# Patient Record
Sex: FEMALE | Race: WHITE | NOT HISPANIC OR LATINO | URBAN - METROPOLITAN AREA
[De-identification: names, ages, dates, MRNs, and addresses within clinical notes are randomized per-mention and may not be internally consistent; named-entity substitution may affect disease eponyms.]

---

## 2020-03-12 ENCOUNTER — TELEPHONE (OUTPATIENT)
Dept: FAMILY MEDICINE CLINIC | Facility: CLINIC | Age: 75
End: 2020-03-12

## 2020-03-12 NOTE — TELEPHONE ENCOUNTER
----- Message from Ella Forrest MA sent at 3/11/2020  3:53 PM EDT -----  This pt is in need for an appointment  This pt have never been seen in out department nor has an [place] [id number] appointment  Please call to make these appointments  After 3 attempts please send back to Allan Oliveira to send letter  If found that the pt has moved or est care somewhere else please provide new address or PCP name to Allan Oliveira  Directions to turn this in to Telephone Call:  Click Telephone Call button In task  When asked if you want to copy the message text to the encounter note, select yes  For all attemps make to contact the pt please make sure you are using the contact info tab in the Telephone Call encounter to document